# Patient Record
Sex: MALE
[De-identification: names, ages, dates, MRNs, and addresses within clinical notes are randomized per-mention and may not be internally consistent; named-entity substitution may affect disease eponyms.]

---

## 2019-04-10 ENCOUNTER — APPOINTMENT (OUTPATIENT)
Dept: UROLOGY | Facility: CLINIC | Age: 34
End: 2019-04-10
Payer: COMMERCIAL

## 2019-04-10 VITALS
BODY MASS INDEX: 27.92 KG/M2 | DIASTOLIC BLOOD PRESSURE: 69 MMHG | HEIGHT: 70 IN | HEART RATE: 77 BPM | SYSTOLIC BLOOD PRESSURE: 106 MMHG | WEIGHT: 195 LBS

## 2019-04-10 LAB
BILIRUB UR QL STRIP: NORMAL
CLARITY UR: CLEAR
COLLECTION METHOD: NORMAL
GLUCOSE UR-MCNC: NORMAL
HCG UR QL: 0.2 EU/DL
HGB UR QL STRIP.AUTO: NORMAL
KETONES UR-MCNC: NORMAL
LEUKOCYTE ESTERASE UR QL STRIP: NORMAL
NITRITE UR QL STRIP: NORMAL
PH UR STRIP: 5.5
PROT UR STRIP-MCNC: NORMAL
SP GR UR STRIP: 1.01

## 2019-04-10 PROCEDURE — 99203 OFFICE O/P NEW LOW 30 MIN: CPT | Mod: 25

## 2019-04-10 PROCEDURE — 81003 URINALYSIS AUTO W/O SCOPE: CPT | Mod: QW

## 2019-04-24 ENCOUNTER — APPOINTMENT (OUTPATIENT)
Dept: UROLOGY | Facility: CLINIC | Age: 34
End: 2019-04-24

## 2019-04-24 NOTE — PHYSICAL EXAM
[General Appearance - Well Nourished] : well nourished [General Appearance - Well Developed] : well developed [General Appearance - In No Acute Distress] : no acute distress [Abdomen Soft] : soft [Abdomen Hernia] : no hernia was discovered [Abdomen Tenderness] : non-tender [Costovertebral Angle Tenderness] : no ~M costovertebral angle tenderness [Penis Abnormality] : normal circumcised penis [Urethral Meatus] : meatus normal [Scrotum] : the scrotum was normal [Testes Tenderness] : no tenderness of the testes [Testes Mass (___cm)] : there were no testicular masses [Oriented To Time, Place, And Person] : oriented to person, place, and time [No Palpable Adenopathy] : no palpable adenopathy [FreeTextEntry1] : Bilateral epididymal tenderness, mild, + cremasterics.  YELENA declined

## 2019-04-24 NOTE — ASSESSMENT
[FreeTextEntry1] : Scrotal sono, scrotal support, nsaids prn. To go to ER for any severe pain or swelling.

## 2019-04-24 NOTE — HISTORY OF PRESENT ILLNESS
[FreeTextEntry1] : Dull pain in R testicle, off and on x 4 days. Denies trama, voiding well. [Nocturia] : nocturia [2] : 2

## 2019-04-28 ENCOUNTER — FORM ENCOUNTER (OUTPATIENT)
Age: 34
End: 2019-04-28

## 2019-04-29 ENCOUNTER — APPOINTMENT (OUTPATIENT)
Dept: ULTRASOUND IMAGING | Facility: CLINIC | Age: 34
End: 2019-04-29
Payer: COMMERCIAL

## 2019-04-29 ENCOUNTER — OUTPATIENT (OUTPATIENT)
Dept: OUTPATIENT SERVICES | Facility: HOSPITAL | Age: 34
LOS: 1 days | End: 2019-04-29
Payer: COMMERCIAL

## 2019-04-29 DIAGNOSIS — N50.811 RIGHT TESTICULAR PAIN: ICD-10-CM

## 2019-04-29 PROCEDURE — 76870 US EXAM SCROTUM: CPT | Mod: 26

## 2019-04-29 PROCEDURE — 76870 US EXAM SCROTUM: CPT

## 2019-05-01 ENCOUNTER — APPOINTMENT (OUTPATIENT)
Dept: UROLOGY | Facility: CLINIC | Age: 34
End: 2019-05-01
Payer: COMMERCIAL

## 2019-05-01 DIAGNOSIS — N50.811 RIGHT TESTICULAR PAIN: ICD-10-CM

## 2019-05-01 PROCEDURE — 99213 OFFICE O/P EST LOW 20 MIN: CPT | Mod: 25

## 2019-05-01 PROCEDURE — 81003 URINALYSIS AUTO W/O SCOPE: CPT | Mod: QW

## 2019-05-18 PROBLEM — N50.811 TESTICULAR PAIN, RIGHT: Status: ACTIVE | Noted: 2019-04-10

## 2019-05-18 NOTE — PHYSICAL EXAM
[General Appearance - Well Developed] : well developed [General Appearance - In No Acute Distress] : no acute distress [Abdomen Soft] : soft [Abdomen Tenderness] : non-tender [General Appearance - Well Nourished] : well nourished [Testes Tenderness] : no tenderness of the testes [Costovertebral Angle Tenderness] : no ~M costovertebral angle tenderness [Testes Mass (___cm)] : there were no testicular masses [Oriented To Time, Place, And Person] : oriented to person, place, and time

## 2019-05-18 NOTE — ASSESSMENT
[FreeTextEntry1] : Ultrasound results/ films reviewed with patient.  Significance of scrotal guillermo discussed. Patient reassured.  Continue scrotal support/ nsaids prn.

## 2020-03-17 ENCOUNTER — APPOINTMENT (OUTPATIENT)
Dept: OTOLARYNGOLOGY | Facility: CLINIC | Age: 35
End: 2020-03-17

## 2020-07-23 ENCOUNTER — APPOINTMENT (OUTPATIENT)
Dept: OTOLARYNGOLOGY | Facility: CLINIC | Age: 35
End: 2020-07-23

## 2022-05-05 ENCOUNTER — OUTPATIENT (OUTPATIENT)
Dept: OUTPATIENT SERVICES | Facility: HOSPITAL | Age: 37
LOS: 1 days | End: 2022-05-05
Payer: COMMERCIAL

## 2022-05-05 ENCOUNTER — APPOINTMENT (OUTPATIENT)
Dept: UROLOGY | Facility: CLINIC | Age: 37
End: 2022-05-05
Payer: COMMERCIAL

## 2022-05-05 VITALS
SYSTOLIC BLOOD PRESSURE: 106 MMHG | WEIGHT: 200 LBS | HEART RATE: 86 BPM | BODY MASS INDEX: 28.63 KG/M2 | DIASTOLIC BLOOD PRESSURE: 74 MMHG | HEIGHT: 70 IN

## 2022-05-05 DIAGNOSIS — R35.0 FREQUENCY OF MICTURITION: ICD-10-CM

## 2022-05-05 PROCEDURE — 93975 VASCULAR STUDY: CPT | Mod: 26

## 2022-05-05 PROCEDURE — 76870 US EXAM SCROTUM: CPT | Mod: 26

## 2022-05-05 PROCEDURE — 99204 OFFICE O/P NEW MOD 45 MIN: CPT | Mod: 25

## 2022-05-05 NOTE — ASSESSMENT
[FreeTextEntry1] : This pleasant  gentleman presents with primary subfertilty, impaired semen quality.  I have requested several baseline blood studies, a semen analysis and additional imaging.  Urine dip and microscopic analysis was requested. We discussed his evaluation today and possible options for therapy depending upon the results of his testing. I will make more specific recommendations after the results of the requested tests return.\par 1. Review blood studies and semen analysis on follow up\par 2. Discuss options for therapy on follow up\par \par Consultation: 40 minutes:  20 minutes reviewing his history and performing a physical examination.  20 minutes reviewing the ultrasound, writing for  blood studies ,discussing treatment options and writing his note. There was also additional time in preparation for today's visit.\par

## 2022-05-05 NOTE — LETTER BODY
[FreeTextEntry1] : Dear Dr. Patrice Bey,\par \par Thank you for referring your patient fuad Nguyen for consultation for impaired fertility.  I have requested several baseline blood studies and a semen analysis. I will see the patient back in followup shortly and make further recommendations. I have attached a copy of my consultation note for your records.\par \par Thank you again for this kind referral. I will certainly keep you updated with further progress. Please do not hesitate to call me if you have any questions.\par \par Best regards,\par \par \par \par Pk Gonsalves M.D., PhD\par Professor of Urology\par    [facility]\par  for Quality\par Director, Reproductive and Sexual Medicine\par    [facility]

## 2022-05-05 NOTE — HISTORY OF PRESENT ILLNESS
[FreeTextEntry1] : Patient is a 36-year-old gentleman who presents with the chief complaint of impaired fertility for evaluation. They have a 7 year old son. They started  trying to conceive over the past year. His wife had a recent miscarriage. An at home semen analysis showed a normal sperm number but decreased motility.  The patient denies fevers, chills, nausea and/or vomiting and no unexplained weight loss.   I reviewed the questionnaire he had completed with him in detail.  His wife, age 34, was not able to accompany him to the visit today. She has not had any prior pregnancies. As I understand her evaluation has been normal to date. They have been trying to achieve pregnancy for the past 6 months without conception. This issue causes both he and his partner significant distress.   He has no known drug allergies.  His past medical history is significant for thyroid cancer (2003)  (see patient questionnaire). His father had testicular cancer at 35 years of age.  In his present occupation in [employer] as a he has no known toxin exposure.  He does not smoke and drinks only socially.  He has no known drug allergies.  His review of systems is non-contributory. His family history is not significant.\par

## 2022-05-06 DIAGNOSIS — I86.1 SCROTAL VARICES: ICD-10-CM

## 2022-05-06 DIAGNOSIS — D09.0 CARCINOMA IN SITU OF BLADDER: ICD-10-CM

## 2022-05-06 DIAGNOSIS — Z85.51 PERSONAL HISTORY OF MALIGNANT NEOPLASM OF BLADDER: ICD-10-CM

## 2022-05-06 PROCEDURE — 76870 US EXAM SCROTUM: CPT

## 2022-05-06 PROCEDURE — 93975 VASCULAR STUDY: CPT

## 2022-05-06 PROCEDURE — 52000 CYSTOURETHROSCOPY: CPT

## 2022-05-09 LAB
25(OH)D3 SERPL-MCNC: 30.8 NG/ML
ALBUMIN SERPL ELPH-MCNC: 4.9 G/DL
ALP BLD-CCNC: 70 U/L
ALT SERPL-CCNC: 24 U/L
ANION GAP SERPL CALC-SCNC: 14 MMOL/L
APPEARANCE: CLEAR
AST SERPL-CCNC: 24 U/L
BASOPHILS # BLD AUTO: 0.04 K/UL
BASOPHILS NFR BLD AUTO: 0.6 %
BILIRUB SERPL-MCNC: 0.6 MG/DL
BILIRUBIN URINE: NEGATIVE
BLOOD URINE: NEGATIVE
BUN SERPL-MCNC: 14 MG/DL
CALCIUM SERPL-MCNC: 10 MG/DL
CHLORIDE SERPL-SCNC: 102 MMOL/L
CHOLEST SERPL-MCNC: 192 MG/DL
CO2 SERPL-SCNC: 26 MMOL/L
COLOR: NORMAL
CREAT SERPL-MCNC: 0.97 MG/DL
EGFR: 103 ML/MIN/1.73M2
EOSINOPHIL # BLD AUTO: 0.07 K/UL
EOSINOPHIL NFR BLD AUTO: 1.1 %
ESTIMATED AVERAGE GLUCOSE: 103 MG/DL
ESTRADIOL SERPL-MCNC: 12 PG/ML
FSH SERPL-MCNC: 4.1 IU/L
GLUCOSE QUALITATIVE U: NEGATIVE
GLUCOSE SERPL-MCNC: 77 MG/DL
HBA1C MFR BLD HPLC: 5.2 %
HCT VFR BLD CALC: 43 %
HDLC SERPL-MCNC: 43 MG/DL
HGB BLD-MCNC: 14.7 G/DL
IMM GRANULOCYTES NFR BLD AUTO: 0.2 %
KETONES URINE: NEGATIVE
LDLC SERPL CALC-MCNC: 97 MG/DL
LEUKOCYTE ESTERASE URINE: NEGATIVE
LH SERPL-ACNC: 4.2 IU/L
LYMPHOCYTES # BLD AUTO: 2.53 K/UL
LYMPHOCYTES NFR BLD AUTO: 40.2 %
MAN DIFF?: NORMAL
MCHC RBC-ENTMCNC: 29.6 PG
MCHC RBC-ENTMCNC: 34.2 GM/DL
MCV RBC AUTO: 86.5 FL
MONOCYTES # BLD AUTO: 0.61 K/UL
MONOCYTES NFR BLD AUTO: 9.7 %
NEUTROPHILS # BLD AUTO: 3.03 K/UL
NEUTROPHILS NFR BLD AUTO: 48.2 %
NITRITE URINE: NEGATIVE
NONHDLC SERPL-MCNC: 149 MG/DL
PH URINE: 6.5
PLATELET # BLD AUTO: 225 K/UL
POTASSIUM SERPL-SCNC: 4 MMOL/L
PROLACTIN SERPL-MCNC: 9.5 NG/ML
PROT SERPL-MCNC: 7.6 G/DL
PROTEIN URINE: NEGATIVE
RBC # BLD: 4.97 M/UL
RBC # FLD: 11.5 %
SODIUM SERPL-SCNC: 141 MMOL/L
SPECIFIC GRAVITY URINE: 1.01
TESTOST FREE SERPL-MCNC: 6.8 PG/ML
TESTOST SERPL-MCNC: 408 NG/DL
TRIGL SERPL-MCNC: 261 MG/DL
TSH SERPL-ACNC: 4.33 UIU/ML
UROBILINOGEN URINE: NORMAL
WBC # FLD AUTO: 6.29 K/UL

## 2022-05-31 ENCOUNTER — APPOINTMENT (OUTPATIENT)
Dept: UROLOGY | Facility: CLINIC | Age: 37
End: 2022-05-31
Payer: COMMERCIAL

## 2022-05-31 DIAGNOSIS — I86.1 SCROTAL VARICES: ICD-10-CM

## 2022-05-31 PROCEDURE — 99214 OFFICE O/P EST MOD 30 MIN: CPT | Mod: 95

## 2022-05-31 NOTE — ASSESSMENT
[FreeTextEntry1] : The patient returns for follow-up to review his recent blood studies and discuss options for therapy.\par \par Semen analysis demonstrated a good number of effectively motile sperm with normal morphology.  His blood tests were normal. In particular his testosterone was 408 ng/dL with a slightly low free testosterone of 6.8 pg/mL his hemoglobin A1c is 5.2% his estradiol 12 pg/mL, LH 4.2 IU/L, prolactin 9.5 ng/mL, FSH 4.1 IU/L and TSH 4.33 µIU/mL.  Medical evaluation was recommended in particular he had a thyroidectomy and is maintained on medications.  His vitamin D 25 was 30.8 ng/mL.  He had a hematocrit of 43%.\par \par His wife's evaluation had been normal and he had undergone 1 recent insemination which resulted in a miscarriage.  I have suggested they get back in touch with a reproductive endocrinologist and either consider continuing with IUI or IVF.  His wife is also under the care of an electrophysiologist for an ablation she has had in the past.  We will meet again in approximately 3 months where he can update me on their progress.\par \par Telehealth Consultation: 30 minutes  10 minutes reviewing his history and discussing prior results.  20 minutes discussing various treatment options, and writing his note. There was also additional time in preparing for the visit and assisting the patient with technology issues he was having with the telehealth platform.

## 2022-05-31 NOTE — HISTORY OF PRESENT ILLNESS
[FreeTextEntry1] : \par \par PMH: Patient is a 36-year-old gentleman who presents with the chief complaint of impaired fertility for evaluation. They have a 7 year old son. They started  trying to conceive over the past year. His wife had a recent miscarriage. An at home semen analysis showed a normal sperm number but decreased motility.  The patient denies fevers, chills, nausea and/or vomiting and no unexplained weight loss.   I reviewed the questionnaire he had completed with him in detail.  His wife, age 34, was not able to accompany him to the visit today. She has not had any prior pregnancies. As I understand her evaluation has been normal to date. They have been trying to achieve pregnancy for the past 6 months without conception. This issue causes both he and his partner significant distress.   He has no known drug allergies.  His past medical history is significant for thyroid cancer (2003)  (see patient questionnaire). His father had testicular cancer at 35 years of age.  In his present occupation in [employer] as a he has no known toxin exposure.  He does not smoke and drinks only socially.  He has no known drug allergies.  His review of systems is non-contributory. His family history is not significant.\par

## 2022-05-31 NOTE — LETTER BODY
[FreeTextEntry1] : Dear Dr. Patrice Bey,\par \par Thank you for referring your patient fuad Nguyen for consultation for impaired fertility.  I have requested several baseline blood studies and a semen analysis. I will see the patient back in followup shortly and make further recommendations. I have attached a copy of my consultation note for your records.\par \par Thank you again for this kind referral. I will certainly keep you updated with further progress. Please do not hesitate to call me if you have any questions.\par \par Best regards,\par \par \par \par Pk Gonsalves M.D., PhD\par Professor of Urology\par    [facility]\par  for Quality\par Director, Reproductive and Sexual Medicine\par    [facility]

## 2022-05-31 NOTE — PHYSICAL EXAM
[General Appearance - Well Developed] : well developed [General Appearance - Well Nourished] : well nourished [Normal Appearance] : normal appearance [Well Groomed] : well groomed [General Appearance - In No Acute Distress] : no acute distress [Oriented To Time, Place, And Person] : oriented to person, place, and time [Affect] : the affect was normal [Mood] : the mood was normal [Not Anxious] : not anxious [No Focal Deficits] : no focal deficits [FreeTextEntry1] : concretion in right anmol-scrotum

## 2023-10-24 ENCOUNTER — NON-APPOINTMENT (OUTPATIENT)
Age: 38
End: 2023-10-24

## 2023-10-24 ENCOUNTER — APPOINTMENT (OUTPATIENT)
Dept: INTERNAL MEDICINE | Facility: CLINIC | Age: 38
End: 2023-10-24
Payer: COMMERCIAL

## 2023-10-24 ENCOUNTER — LABORATORY RESULT (OUTPATIENT)
Age: 38
End: 2023-10-24

## 2023-10-24 VITALS
HEIGHT: 68.5 IN | DIASTOLIC BLOOD PRESSURE: 77 MMHG | HEART RATE: 68 BPM | WEIGHT: 186 LBS | BODY MASS INDEX: 27.87 KG/M2 | SYSTOLIC BLOOD PRESSURE: 117 MMHG

## 2023-10-24 DIAGNOSIS — E03.9 HYPOTHYROIDISM, UNSPECIFIED: ICD-10-CM

## 2023-10-24 DIAGNOSIS — Z00.00 ENCOUNTER FOR GENERAL ADULT MEDICAL EXAMINATION W/OUT ABNORMAL FINDINGS: ICD-10-CM

## 2023-10-24 DIAGNOSIS — Z80.0 FAMILY HISTORY OF MALIGNANT NEOPLASM OF DIGESTIVE ORGANS: ICD-10-CM

## 2023-10-24 DIAGNOSIS — Z85.850 PERSONAL HISTORY OF MALIGNANT NEOPLASM OF THYROID: ICD-10-CM

## 2023-10-24 DIAGNOSIS — Z78.9 OTHER SPECIFIED HEALTH STATUS: ICD-10-CM

## 2023-10-24 DIAGNOSIS — Z82.49 FAMILY HISTORY OF ISCHEMIC HEART DISEASE AND OTHER DISEASES OF THE CIRCULATORY SYSTEM: ICD-10-CM

## 2023-10-24 DIAGNOSIS — Z82.3 FAMILY HISTORY OF STROKE: ICD-10-CM

## 2023-10-24 PROCEDURE — 93000 ELECTROCARDIOGRAM COMPLETE: CPT

## 2023-10-24 PROCEDURE — 36415 COLL VENOUS BLD VENIPUNCTURE: CPT

## 2023-10-24 PROCEDURE — 99385 PREV VISIT NEW AGE 18-39: CPT | Mod: 25

## 2023-10-24 RX ORDER — LEVOTHYROXINE SODIUM 0.17 MG/1
175 TABLET ORAL
Refills: 0 | Status: ACTIVE | COMMUNITY

## 2023-10-31 ENCOUNTER — CLINICAL ADVICE (OUTPATIENT)
Age: 38
End: 2023-10-31

## 2023-10-31 LAB
ALBUMIN SERPL ELPH-MCNC: 4.7 G/DL
ALP BLD-CCNC: 72 U/L
ALT SERPL-CCNC: 20 U/L
ANION GAP SERPL CALC-SCNC: 12 MMOL/L
APPEARANCE: CLEAR
AST SERPL-CCNC: 23 U/L
BACTERIA: NEGATIVE /HPF
BASOPHILS # BLD AUTO: 0.04 K/UL
BASOPHILS NFR BLD AUTO: 0.7 %
BILIRUB DIRECT SERPL-MCNC: 0.2 MG/DL
BILIRUB INDIRECT SERPL-MCNC: 0.9 MG/DL
BILIRUB SERPL-MCNC: 1.1 MG/DL
BILIRUBIN URINE: NEGATIVE
BLOOD URINE: NEGATIVE
BUN SERPL-MCNC: 23 MG/DL
CALCIUM SERPL-MCNC: 10 MG/DL
CAST: 0 /LPF
CHLORIDE SERPL-SCNC: 101 MMOL/L
CHOLEST SERPL-MCNC: 187 MG/DL
CO2 SERPL-SCNC: 26 MMOL/L
COLOR: YELLOW
CREAT SERPL-MCNC: 1.09 MG/DL
EGFR: 89 ML/MIN/1.73M2
EOSINOPHIL # BLD AUTO: 0.06 K/UL
EOSINOPHIL NFR BLD AUTO: 1.1 %
EPITHELIAL CELLS: 0 /HPF
ESTIMATED AVERAGE GLUCOSE: 97 MG/DL
FERRITIN SERPL-MCNC: 202 NG/ML
FOLATE SERPL-MCNC: 18.7 NG/ML
GLUCOSE QUALITATIVE U: NEGATIVE MG/DL
GLUCOSE SERPL-MCNC: 85 MG/DL
HBA1C MFR BLD HPLC: 5 %
HCT VFR BLD CALC: 40.9 %
HCV AB SER QL: NONREACTIVE
HCV S/CO RATIO: 0.1 S/CO
HDLC SERPL-MCNC: 53 MG/DL
HGB BLD-MCNC: 13.4 G/DL
HIV1+2 AB SPEC QL IA.RAPID: NONREACTIVE
IMM GRANULOCYTES NFR BLD AUTO: 0 %
IRON SATN MFR SERPL: 31 %
IRON SERPL-MCNC: 97 UG/DL
KETONES URINE: NEGATIVE MG/DL
LDLC SERPL CALC-MCNC: 119 MG/DL
LEUKOCYTE ESTERASE URINE: NEGATIVE
LYMPHOCYTES # BLD AUTO: 2 K/UL
LYMPHOCYTES NFR BLD AUTO: 36.8 %
MAN DIFF?: NORMAL
MCHC RBC-ENTMCNC: 28.8 PG
MCHC RBC-ENTMCNC: 32.8 GM/DL
MCV RBC AUTO: 87.8 FL
MICROSCOPIC-UA: NORMAL
MONOCYTES # BLD AUTO: 0.55 K/UL
MONOCYTES NFR BLD AUTO: 10.1 %
NEUTROPHILS # BLD AUTO: 2.79 K/UL
NEUTROPHILS NFR BLD AUTO: 51.3 %
NITRITE URINE: NEGATIVE
NONHDLC SERPL-MCNC: 134 MG/DL
PH URINE: 6.5
PLATELET # BLD AUTO: 190 K/UL
POTASSIUM SERPL-SCNC: 4.2 MMOL/L
PROT SERPL-MCNC: 7.3 G/DL
PROTEIN URINE: NEGATIVE MG/DL
RBC # BLD: 4.66 M/UL
RBC # FLD: 12.2 %
RED BLOOD CELLS URINE: 0 /HPF
SODIUM SERPL-SCNC: 139 MMOL/L
SPECIFIC GRAVITY URINE: 1.02
T3FREE SERPL-MCNC: 3.26 PG/ML
T3RU NFR SERPL: 0.8 TBI
T4 FREE SERPL-MCNC: 2.5 NG/DL
THYROGLOB AB SERPL-ACNC: <1 IU/ML
THYROPEROXIDASE AB SERPL IA-ACNC: <9 IU/ML
TIBC SERPL-MCNC: 311 UG/DL
TRIGL SERPL-MCNC: 83 MG/DL
TSH SERPL-ACNC: 0.01 UIU/ML
UIBC SERPL-MCNC: 215 UG/DL
UROBILINOGEN URINE: 0.2 MG/DL
VIT B12 SERPL-MCNC: 1000 PG/ML
WBC # FLD AUTO: 5.44 K/UL
WHITE BLOOD CELLS URINE: 0 /HPF